# Patient Record
Sex: FEMALE | Race: WHITE | NOT HISPANIC OR LATINO | Employment: STUDENT | RURAL
[De-identification: names, ages, dates, MRNs, and addresses within clinical notes are randomized per-mention and may not be internally consistent; named-entity substitution may affect disease eponyms.]

---

## 2023-06-14 ENCOUNTER — OFFICE VISIT (OUTPATIENT)
Dept: FAMILY MEDICINE CLINIC | Facility: CLINIC | Age: 53
End: 2023-06-14
Payer: COMMERCIAL

## 2023-06-14 VITALS
OXYGEN SATURATION: 99 % | DIASTOLIC BLOOD PRESSURE: 106 MMHG | HEART RATE: 115 BPM | WEIGHT: 222 LBS | BODY MASS INDEX: 43.59 KG/M2 | HEIGHT: 60 IN | SYSTOLIC BLOOD PRESSURE: 178 MMHG

## 2023-06-14 DIAGNOSIS — R23.2 HOT FLASHES: Primary | ICD-10-CM

## 2023-06-14 DIAGNOSIS — Z13.220 SCREENING FOR HYPERLIPIDEMIA: ICD-10-CM

## 2023-06-14 DIAGNOSIS — Z13.1 SCREENING FOR DIABETES MELLITUS (DM): ICD-10-CM

## 2023-06-14 DIAGNOSIS — F41.1 GENERALIZED ANXIETY DISORDER: ICD-10-CM

## 2023-06-14 DIAGNOSIS — I10 PRIMARY HYPERTENSION: ICD-10-CM

## 2023-06-14 PROCEDURE — 99204 OFFICE O/P NEW MOD 45 MIN: CPT | Performed by: INTERNAL MEDICINE

## 2023-06-14 RX ORDER — PAROXETINE 10 MG/1
10 TABLET, FILM COATED ORAL NIGHTLY
Qty: 90 TABLET | Refills: 0 | Status: SHIPPED | OUTPATIENT
Start: 2023-06-14

## 2023-06-14 RX ORDER — HYDROXYZINE HYDROCHLORIDE 25 MG/1
25 TABLET, FILM COATED ORAL 3 TIMES DAILY PRN
Qty: 90 TABLET | Refills: 3 | Status: SHIPPED | OUTPATIENT
Start: 2023-06-14

## 2023-06-14 RX ORDER — HYDROCHLOROTHIAZIDE 25 MG/1
25 TABLET ORAL DAILY
Qty: 90 TABLET | Refills: 0 | Status: SHIPPED | OUTPATIENT
Start: 2023-06-14

## 2023-06-14 RX ORDER — HYDROXYZINE HYDROCHLORIDE 25 MG/1
25 TABLET, FILM COATED ORAL 3 TIMES DAILY PRN
COMMUNITY
Start: 2023-06-09 | End: 2023-06-14 | Stop reason: SDUPTHER

## 2023-06-14 NOTE — PROGRESS NOTES
Office Note     Name: Chelsie Mccoy    : 1970     MRN: [id number]     Chief Complaint  Establish Care, Hypertension, Anxiety, Menopause (Pt complains of menopause symptoms. She states this has caused rapid weight gain which has led to her stress. ), and Obesity    Subjective     History of Present Illness:  Chelsie Mccoy is a 52 y.o. female who presents today for EST   Previously was a  but is currently in school full time getting her masters in Sociology.  Is also moving and takes care of her mother.  Had a bad breakup about 5 years ago    Started having perimenopausal symptoms about 2 years ago and stopped having periods 2 years ago    Exercises 4 times per week (with both cardio and weight training) ad has become more diligent about limiting sweets over the last few years and has been limiting calories to about 1500 calories for at last a year, but weight gain has become very persistent, especially in the central abdomen, but has gained about 80 lb in the last 3 years.  Eat as a high fiber diet, has tried vegetarian diet.    Because she previously was very fit these symptoms provoke her anxiety and makes her dread social functions and causes her to avoid social settings and avoid going to the doctor.       Skin has become excessively dry in recent years whereas it has been oily in the past.  Body hair has thinned.    Is due for routine testing.    Was seen in ER and was given hydroxyzine which helps her  sleep at night and has ahead a somewhat claming effect but overall still feel incredibly anxious and stressed.  Feels that her weight gain, fear of health related issues, fear of being judged for weight gain etc has contributed significantly to her anxiety    Review of Systems:   Review of Systems   Gastrointestinal:  Negative for constipation, diarrhea and vomiting.     Past Medical History: History reviewed. No pertinent past medical history.    Past Surgical History:   Past  "Surgical History:   Procedure Laterality Date     SECTION      x2       Family History:   Family History   Problem Relation Age of Onset    Diabetes Mother     Heart disease Mother     Breast cancer Mother 48    Colon cancer Neg Hx        Social History:   Social History     Socioeconomic History    Marital status:    Tobacco Use    Smoking status: Never     Passive exposure: Never    Smokeless tobacco: Never   Vaping Use    Vaping Use: Never used   Substance and Sexual Activity    Alcohol use: Not Currently    Drug use: Never    Sexual activity: Defer       Immunizations:   Immunization History   Administered Date(s) Administered    COVID-19 (PFIZER) BIVALENT 12+YRS 2022    COVID-19 (PFIZER) Purple Cap Monovalent 2021, 2021, 2021    Influenza Injectable Mdck Pf Quad 2022        Medications:     Current Outpatient Medications:     hydrOXYzine (ATARAX) 25 MG tablet, Take 1 tablet by mouth 3 (Three) Times a Day As Needed. for anxiety, Disp: , Rfl:     Allergies:   No Known Allergies    Objective     Vital Signs  BP (!) 178/106   Pulse 115   Ht 152.4 cm (60\")   Wt 101 kg (222 lb)   SpO2 99%   BMI 43.36 kg/m²   Estimated body mass index is 43.36 kg/m² as calculated from the following:    Height as of this encounter: 152.4 cm (60\").    Weight as of this encounter: 101 kg (222 lb).          Physical Exam  Vitals and nursing note reviewed.   Constitutional:       Appearance: Normal appearance.   Neck:      Thyroid: No thyromegaly.   Cardiovascular:      Rate and Rhythm: Normal rate and regular rhythm.      Heart sounds: No murmur heard.    No friction rub. No gallop.   Pulmonary:      Effort: Pulmonary effort is normal.      Breath sounds: Normal breath sounds. No wheezing, rhonchi or rales.   Neurological:      Mental Status: She is alert.        Procedures     Assessment and Plan     Records obtained from patient's recent emergency room visit from [facility] " "[facility] with date of service 6/9/2023.  Summary: 52-year-old female who presented to the ER with anxiety and elevated blood pressure in the 180s to 190s systolic.  Had also reported menopausal symptoms of hot flashes.  Evaded blood pressure had recently.  Is dented her from getting a dental procedure completed.  Of note she also had an appointment scheduled in our office that same afternoon but went to ER beforehan so the appt was cancelled.    The note from [facility] in terms of past medical history surgical history family and social history all states \"see chart\" but there is no other information.  Pressure in the ER was 185/114.  He was given hydroxyzine.  She had an EKG not show signs of ischemia or arrhythmia.  Discharged with an outpatient prescription for hydroxyzine as needed for anxiety.  He did have some relief with the dose that was given in the ER.    It does not appear that any labs were done.    1. Hot flashes  - TSH Rfx On Abnormal To Free T4  - CBC Auto Differential  - PARoxetine (Paxil) 10 MG tablet; Take 1 tablet by mouth Every Night.  Dispense: 90 tablet; Refill: 0    2. Generalized anxiety disorder  - TSH Rfx On Abnormal To Free T4  - CBC Auto Differential  - PARoxetine (Paxil) 10 MG tablet; Take 1 tablet by mouth Every Night.  Dispense: 90 tablet; Refill: 0  - hydrOXYzine (ATARAX) 25 MG tablet; Take 1 tablet by mouth 3 (Three) Times a Day As Needed for Anxiety. for anxiety  Dispense: 90 tablet; Refill: 3    3. Primary hypertension  - TSH Rfx On Abnormal To Free T4  - Lipid Panel  - hydroCHLOROthiazide (HYDRODIURIL) 25 MG tablet; Take 1 tablet by mouth Daily.  Dispense: 90 tablet; Refill: 0    4. Screening for hyperlipidemia  - Lipid Panel    5. Screening for diabetes mellitus (DM)  - Hemoglobin A1c  - Comprehensive Metabolic Panel           Follow Up  Return in about 2 months (around 8/14/2023).    Patient was given instructions and counseling regarding her condition or for health maintenance " advice. Please see specific information pulled into the AVS if appropriate.     MD NICK [facility]  [address] [facility] [address] [phone]

## 2023-06-15 DIAGNOSIS — E78.2 MIXED HYPERLIPIDEMIA: Primary | ICD-10-CM

## 2023-06-15 DIAGNOSIS — R74.8 ELEVATED LIVER ENZYMES: ICD-10-CM

## 2023-06-15 LAB
ALBUMIN SERPL-MCNC: 4.8 G/DL (ref 3.8–4.8)
ALBUMIN/GLOB SERPL: 2.1 {RATIO} (ref 1.2–2.2)
ALP SERPL-CCNC: 99 IU/L (ref 44–121)
ALT SERPL-CCNC: 52 IU/L (ref 0–32)
AST SERPL-CCNC: 43 IU/L (ref 0–40)
BASOPHILS # BLD AUTO: 0.1 X10E3/UL (ref 0–0.2)
BASOPHILS NFR BLD AUTO: 1 %
BILIRUB SERPL-MCNC: 0.3 MG/DL (ref 0–1.2)
BUN SERPL-MCNC: 11 MG/DL (ref 6–24)
BUN/CREAT SERPL: 16 (ref 9–23)
CALCIUM SERPL-MCNC: 9.9 MG/DL (ref 8.7–10.2)
CHLORIDE SERPL-SCNC: 105 MMOL/L (ref 96–106)
CHOLEST SERPL-MCNC: 243 MG/DL (ref 100–199)
CO2 SERPL-SCNC: 19 MMOL/L (ref 20–29)
CREAT SERPL-MCNC: 0.7 MG/DL (ref 0.57–1)
EGFRCR SERPLBLD CKD-EPI 2021: 105 ML/MIN/1.73
EOSINOPHIL # BLD AUTO: 0.1 X10E3/UL (ref 0–0.4)
EOSINOPHIL NFR BLD AUTO: 2 %
ERYTHROCYTE [DISTWIDTH] IN BLOOD BY AUTOMATED COUNT: 13.5 % (ref 11.7–15.4)
GLOBULIN SER CALC-MCNC: 2.3 G/DL (ref 1.5–4.5)
GLUCOSE SERPL-MCNC: 109 MG/DL (ref 70–99)
HBA1C MFR BLD: 5.3 % (ref 4.8–5.6)
HCT VFR BLD AUTO: 42.1 % (ref 34–46.6)
HDLC SERPL-MCNC: 44 MG/DL
HGB BLD-MCNC: 14.9 G/DL (ref 11.1–15.9)
IMM GRANULOCYTES # BLD AUTO: 0 X10E3/UL (ref 0–0.1)
IMM GRANULOCYTES NFR BLD AUTO: 0 %
LDLC SERPL CALC-MCNC: 162 MG/DL (ref 0–99)
LYMPHOCYTES # BLD AUTO: 1.8 X10E3/UL (ref 0.7–3.1)
LYMPHOCYTES NFR BLD AUTO: 23 %
MCH RBC QN AUTO: 31.1 PG (ref 26.6–33)
MCHC RBC AUTO-ENTMCNC: 35.4 G/DL (ref 31.5–35.7)
MCV RBC AUTO: 88 FL (ref 79–97)
MONOCYTES # BLD AUTO: 0.6 X10E3/UL (ref 0.1–0.9)
MONOCYTES NFR BLD AUTO: 7 %
NEUTROPHILS # BLD AUTO: 5.5 X10E3/UL (ref 1.4–7)
NEUTROPHILS NFR BLD AUTO: 67 %
PLATELET # BLD AUTO: 232 X10E3/UL (ref 150–450)
POTASSIUM SERPL-SCNC: 4.2 MMOL/L (ref 3.5–5.2)
PROT SERPL-MCNC: 7.1 G/DL (ref 6–8.5)
RBC # BLD AUTO: 4.79 X10E6/UL (ref 3.77–5.28)
SODIUM SERPL-SCNC: 141 MMOL/L (ref 134–144)
TRIGL SERPL-MCNC: 198 MG/DL (ref 0–149)
TSH SERPL DL<=0.005 MIU/L-ACNC: 2.45 UIU/ML (ref 0.45–4.5)
VLDLC SERPL CALC-MCNC: 37 MG/DL (ref 5–40)
WBC # BLD AUTO: 8.1 X10E3/UL (ref 3.4–10.8)

## 2023-06-15 RX ORDER — ATORVASTATIN CALCIUM 20 MG/1
20 TABLET, FILM COATED ORAL DAILY
Qty: 90 TABLET | Refills: 1 | Status: SHIPPED | OUTPATIENT
Start: 2023-06-15

## 2023-07-21 RX ORDER — LISINOPRIL 10 MG/1
10 TABLET ORAL DAILY
Qty: 30 TABLET | Refills: 0 | Status: CANCELLED | OUTPATIENT
Start: 2023-07-21

## 2023-07-21 NOTE — TELEPHONE ENCOUNTER
Caller: Vashti Mccoye    Relationship: Self    Best call back number: [phone]     Requested Prescriptions:   Requested Prescriptions     Pending Prescriptions Disp Refills    lisinopril (PRINIVIL,ZESTRIL) 10 MG tablet 30 tablet 0     Sig: Take 1 tablet by mouth Daily.        Pharmacy where request should be sent: [facility] #[id number] [address] AT [facility] & [address] [facility] [phone] [facility] [phone] FX     Last office visit with prescribing clinician: 7/21/2023   Last telemedicine visit with prescribing clinician: Visit date not found   Next office visit with prescribing clinician: 8/16/2023     Additional details provided by patient: WAS SEEN TODAY AND NEEDS THIS CALLED IN     Does the patient have less than a 3 day supply:  [x] Yes  [] No    Would you like a call back once the refill request has been completed: [] Yes [x] No    If the office needs to give you a call back, can they leave a voicemail: [] Yes [x] No    Janeth Landry Rep   07/21/23 10:28 EDT

## 2023-09-10 DIAGNOSIS — R23.2 HOT FLASHES: ICD-10-CM

## 2023-09-10 DIAGNOSIS — I10 PRIMARY HYPERTENSION: ICD-10-CM

## 2023-09-10 DIAGNOSIS — E78.2 MIXED HYPERLIPIDEMIA: ICD-10-CM

## 2023-09-10 DIAGNOSIS — F41.1 GENERALIZED ANXIETY DISORDER: ICD-10-CM

## 2023-09-11 DIAGNOSIS — E78.2 MIXED HYPERLIPIDEMIA: ICD-10-CM

## 2023-09-11 DIAGNOSIS — I10 PRIMARY HYPERTENSION: ICD-10-CM

## 2023-09-11 DIAGNOSIS — R23.2 HOT FLASHES: ICD-10-CM

## 2023-09-11 DIAGNOSIS — F41.1 GENERALIZED ANXIETY DISORDER: ICD-10-CM

## 2023-09-11 RX ORDER — ATORVASTATIN CALCIUM 20 MG/1
20 TABLET, FILM COATED ORAL DAILY
Qty: 90 TABLET | Refills: 0 | Status: SHIPPED | OUTPATIENT
Start: 2023-09-11

## 2023-09-11 RX ORDER — HYDROCHLOROTHIAZIDE 25 MG/1
25 TABLET ORAL DAILY
Qty: 90 TABLET | Refills: 0 | Status: SHIPPED | OUTPATIENT
Start: 2023-09-11

## 2023-09-11 RX ORDER — PAROXETINE 10 MG/1
10 TABLET, FILM COATED ORAL NIGHTLY
Qty: 90 TABLET | Refills: 0 | Status: SHIPPED | OUTPATIENT
Start: 2023-09-11

## 2023-09-12 RX ORDER — HYDROCHLOROTHIAZIDE 25 MG/1
25 TABLET ORAL DAILY
Qty: 90 TABLET | Refills: 0 | OUTPATIENT
Start: 2023-09-12

## 2023-09-13 RX ORDER — ATORVASTATIN CALCIUM 20 MG/1
20 TABLET, FILM COATED ORAL DAILY
Qty: 90 TABLET | Refills: 1 | OUTPATIENT
Start: 2023-09-13

## 2023-09-13 RX ORDER — PAROXETINE 10 MG/1
10 TABLET, FILM COATED ORAL NIGHTLY
Qty: 90 TABLET | Refills: 0 | OUTPATIENT
Start: 2023-09-13

## 2023-10-15 DIAGNOSIS — I10 HYPERTENSION, ESSENTIAL: ICD-10-CM

## 2023-10-16 RX ORDER — LISINOPRIL 10 MG/1
10 TABLET ORAL DAILY
Qty: 90 TABLET | Refills: 0 | Status: SHIPPED | OUTPATIENT
Start: 2023-10-16

## 2023-12-01 DIAGNOSIS — F41.1 GENERALIZED ANXIETY DISORDER: ICD-10-CM

## 2023-12-01 DIAGNOSIS — E78.2 MIXED HYPERLIPIDEMIA: ICD-10-CM

## 2023-12-01 DIAGNOSIS — R23.2 HOT FLASHES: ICD-10-CM

## 2023-12-01 DIAGNOSIS — I10 PRIMARY HYPERTENSION: ICD-10-CM

## 2023-12-01 RX ORDER — ATORVASTATIN CALCIUM 20 MG/1
20 TABLET, FILM COATED ORAL DAILY
Qty: 90 TABLET | Refills: 0 | Status: SHIPPED | OUTPATIENT
Start: 2023-12-01

## 2023-12-01 RX ORDER — HYDROCHLOROTHIAZIDE 25 MG/1
25 TABLET ORAL DAILY
Qty: 90 TABLET | Refills: 0 | Status: SHIPPED | OUTPATIENT
Start: 2023-12-01

## 2023-12-01 RX ORDER — PAROXETINE 10 MG/1
10 TABLET, FILM COATED ORAL NIGHTLY
Qty: 90 TABLET | Refills: 0 | Status: SHIPPED | OUTPATIENT
Start: 2023-12-01

## 2023-12-07 DIAGNOSIS — I10 PRIMARY HYPERTENSION: ICD-10-CM

## 2023-12-07 RX ORDER — HYDROCHLOROTHIAZIDE 25 MG/1
25 TABLET ORAL DAILY
Qty: 90 TABLET | Refills: 0 | OUTPATIENT
Start: 2023-12-07

## 2024-01-15 DIAGNOSIS — I10 HYPERTENSION, ESSENTIAL: ICD-10-CM

## 2024-01-17 NOTE — TELEPHONE ENCOUNTER
Caller: Chelsie Mccoy    Relationship: Self    Best call back number: [phone]    Requested Prescriptions:   Requested Prescriptions     Pending Prescriptions Disp Refills    lisinopril (PRINIVIL,ZESTRIL) 10 MG tablet [Pharmacy Med Name: LISINOPRIL 10MG TABLETS] 90 tablet 0     Sig: TAKE 1 TABLET BY MOUTH DAILY        Pharmacy where request should be sent: [facility] #[id number] [place] [address] [address] AT [facility] & [address] [facility] [phone] [facility] [phone]      Last office visit with prescribing clinician: 7/21/2023   Last telemedicine visit with prescribing clinician: Visit date not found   Next office visit with prescribing clinician: 1/22/2024     Additional details provided by patient:     Does the patient have less than a 3 day supply:  [x] Yes  [] No    Janeth Padilla Rep   01/17/24 09:10 EST

## 2024-01-18 NOTE — TELEPHONE ENCOUNTER
PT CALLED TO CHECK STATUS FOR RX      lisinopril (PRINIVIL,ZESTRIL) 10 MG tablet   REFILL.    PLEASE ADVISE.cALL BACK:[id number]    [facility] #[id number] [place] [address] [address] AT [facility] & [address] [facility] - [phone] [facility] [phone] FX

## 2024-01-19 RX ORDER — LISINOPRIL 10 MG/1
10 TABLET ORAL DAILY
Qty: 90 TABLET | Refills: 0 | Status: SHIPPED | OUTPATIENT
Start: 2024-01-19

## 2024-01-19 NOTE — TELEPHONE ENCOUNTER
PT CALLED TO CHECK STATUS FOR RX  lisinopril (PRINIVIL,ZESTRIL) 10 MG tablet .    PLEASE ADVISE.cALL BACK:[id number]

## 2024-03-15 ENCOUNTER — OFFICE VISIT (OUTPATIENT)
Dept: FAMILY MEDICINE CLINIC | Facility: CLINIC | Age: 54
End: 2024-03-15
Payer: COMMERCIAL

## 2024-03-15 VITALS
HEIGHT: 60 IN | OXYGEN SATURATION: 100 % | BODY MASS INDEX: 46.72 KG/M2 | DIASTOLIC BLOOD PRESSURE: 70 MMHG | SYSTOLIC BLOOD PRESSURE: 140 MMHG | WEIGHT: 238 LBS | HEART RATE: 85 BPM

## 2024-03-15 DIAGNOSIS — I10 HYPERTENSION, ESSENTIAL: ICD-10-CM

## 2024-03-15 DIAGNOSIS — I10 PRIMARY HYPERTENSION: ICD-10-CM

## 2024-03-15 DIAGNOSIS — Z11.59 ENCOUNTER FOR HEPATITIS C SCREENING TEST FOR LOW RISK PATIENT: ICD-10-CM

## 2024-03-15 DIAGNOSIS — F41.1 GENERALIZED ANXIETY DISORDER: ICD-10-CM

## 2024-03-15 DIAGNOSIS — E78.2 MIXED HYPERLIPIDEMIA: ICD-10-CM

## 2024-03-15 DIAGNOSIS — I10 POORLY-CONTROLLED HYPERTENSION: ICD-10-CM

## 2024-03-15 DIAGNOSIS — R23.2 HOT FLASHES: ICD-10-CM

## 2024-03-15 DIAGNOSIS — E66.01 MORBID (SEVERE) OBESITY DUE TO EXCESS CALORIES: ICD-10-CM

## 2024-03-15 DIAGNOSIS — Z12.31 ENCOUNTER FOR SCREENING MAMMOGRAM FOR MALIGNANT NEOPLASM OF BREAST: ICD-10-CM

## 2024-03-15 DIAGNOSIS — Z13.1 SCREENING FOR DIABETES MELLITUS (DM): ICD-10-CM

## 2024-03-15 DIAGNOSIS — Z12.4 SCREENING FOR CERVICAL CANCER: ICD-10-CM

## 2024-03-15 DIAGNOSIS — Z12.11 SCREEN FOR COLON CANCER: ICD-10-CM

## 2024-03-15 DIAGNOSIS — Z00.00 ADULT GENERAL MEDICAL EXAM: Primary | ICD-10-CM

## 2024-03-15 RX ORDER — PAROXETINE 10 MG/1
10 TABLET, FILM COATED ORAL NIGHTLY
Qty: 90 TABLET | Refills: 1 | Status: SHIPPED | OUTPATIENT
Start: 2024-03-15

## 2024-03-15 RX ORDER — ATORVASTATIN CALCIUM 20 MG/1
20 TABLET, FILM COATED ORAL DAILY
Qty: 90 TABLET | Refills: 1 | Status: SHIPPED | OUTPATIENT
Start: 2024-03-15

## 2024-03-15 RX ORDER — LISINOPRIL 10 MG/1
10 TABLET ORAL DAILY
Qty: 90 TABLET | Refills: 1 | Status: SHIPPED | OUTPATIENT
Start: 2024-03-15

## 2024-03-15 RX ORDER — HYDROCHLOROTHIAZIDE 25 MG/1
25 TABLET ORAL DAILY
Qty: 90 TABLET | Refills: 1 | Status: SHIPPED | OUTPATIENT
Start: 2024-03-15

## 2024-03-15 NOTE — PROGRESS NOTES
Office Note     Name: Chelsie Mccoy    : 1970     MRN: [id number]     Chief Complaint  Annual Exam    Subjective     History of Present Illness:  Chelsie Mccoy is a 53 y.o. female who presents today for annual and poorly controlled HTN    She was last seen in 2023 and  was supposed to come back in 2023, but has cancelled 17 appointmetns since then and has not been seen.     HTN:  BP tends to be running 110s/60s at home but ran out of [facility] several days ago, it is well controleld when she takes both     Heart rate has been doing much better than previsuoly.    Anxiety doing well on paxil, feels good overall.    Obesity: would like to lost weight.  Does a lot of active  activity with work and her dogs     Diet/Physical activity:normal        PHQ-2 Depression Screening  Little interest or pleasure in doing things? 0-->not at all   Feeling down, depressed, or hopeless? 0-->not at all   PHQ-2 Total Score 0          Review of Systems:   Review of Systems    Past Medical History:   Past Medical History:   Diagnosis Date    Hypertension        Past Surgical History:   Past Surgical History:   Procedure Laterality Date     SECTION      x2       Family History:   Family History   Problem Relation Age of Onset    Diabetes Mother     Heart disease Mother     Breast cancer Mother 48    Colon cancer Neg Hx        Social History:   Social History     Socioeconomic History    Marital status:    Tobacco Use    Smoking status: Never     Passive exposure: Never    Smokeless tobacco: Never   Vaping Use    Vaping status: Never Used   Substance and Sexual Activity    Alcohol use: Not Currently    Drug use: Never    Sexual activity: Not Currently     Birth control/protection: Post-menopausal       Immunizations:   Immunization History   Administered Date(s) Administered    COVID-19 (PFIZER) BIVALENT 12+YRS 2022    COVID-19 (PFIZER) Purple Cap Monovalent 2021, 2021,  "11/05/2021    Influenza Injectable Mdck Pf Quad 09/11/2022        Medications:     Current Outpatient Medications:     atorvastatin (LIPITOR) 20 MG tablet, Take 1 tablet by mouth Daily., Disp: 90 tablet, Rfl: 1    hydroCHLOROthiazide 25 MG tablet, Take 1 tablet by mouth Daily., Disp: 90 tablet, Rfl: 1    hydrOXYzine (ATARAX) 25 MG tablet, Take 1 tablet by mouth 3 (Three) Times a Day As Needed for Anxiety. for anxiety, Disp: 90 tablet, Rfl: 3    lisinopril (PRINIVIL,ZESTRIL) 10 MG tablet, Take 1 tablet by mouth Daily., Disp: 90 tablet, Rfl: 1    PARoxetine (Paxil) 10 MG tablet, Take 1 tablet by mouth Every Night., Disp: 90 tablet, Rfl: 1    Allergies:   No Known Allergies    Objective     Vital Signs  /70   Pulse 85   Ht 152.4 cm (60\")   Wt 108 kg (238 lb)   SpO2 100%   BMI 46.48 kg/m²   Estimated body mass index is 46.48 kg/m² as calculated from the following:    Height as of this encounter: 152.4 cm (60\").    Weight as of this encounter: 108 kg (238 lb).            Physical Exam  Vitals and nursing note reviewed.   Constitutional:       Appearance: Normal appearance.   Cardiovascular:      Rate and Rhythm: Normal rate and regular rhythm.      Heart sounds: No murmur heard.     No friction rub. No gallop.   Pulmonary:      Effort: Pulmonary effort is normal.      Breath sounds: Normal breath sounds. No wheezing, rhonchi or rales.   Neurological:      Mental Status: She is alert.              Procedures       Colorectal Screening:     Last Completed Colonoscopy       This patient has no relevant Health Maintenance data.          Pap:    Last Completed Pap Smear       This patient has no relevant Health Maintenance data.           Mammogram:    Last Completed Mammogram       This patient has no relevant Health Maintenance data.                         Assessment and Plan     Annual Preventative Exam    Healthcare Maintenance:   Chelsie Mccoy voices understanding and acceptance of this advice and will call " back with any further questions or concerns. AVS with preventive healthcare tips printed for patient.     Anticipatory Guidance:  Ages 40 to 64 Counseling/Anticipatory Guidance Discussed: immunizations and screenings    1. Adult general medical exam      2. Encounter for screening mammogram for malignant neoplasm of breast    - Mammo Screening Bilateral With CAD    3. Screen for colon cancer    - Ambulatory Referral For Screening Colonoscopy    4. Encounter for hepatitis C screening test for low risk patient    - Hepatitis C Antibody  - Lipid Panel    5. Screening for diabetes mellitus (DM)    - Comprehensive Metabolic Panel  - Hemoglobin A1c    6. Poorly-controlled hypertension    - TSH Rfx On Abnormal To Free T4  - Comprehensive Metabolic Panel    7. Mixed hyperlipidemia    - TSH Rfx On Abnormal To Free T4  - Comprehensive Metabolic Panel  - atorvastatin (LIPITOR) 20 MG tablet; Take 1 tablet by mouth Daily.  Dispense: 90 tablet; Refill: 1    8. Morbid (severe) obesity due to excess calories    Info added to AVS    9. Primary hypertension    - hydroCHLOROthiazide 25 MG tablet; Take 1 tablet by mouth Daily.  Dispense: 90 tablet; Refill: 1    10. Hypertension, essential    - lisinopril (PRINIVIL,ZESTRIL) 10 MG tablet; Take 1 tablet by mouth Daily.  Dispense: 90 tablet; Refill: 1    11. Hot flashes    - PARoxetine (Paxil) 10 MG tablet; Take 1 tablet by mouth Every Night.  Dispense: 90 tablet; Refill: 1    12. Generalized anxiety disorder    - PARoxetine (Paxil) 10 MG tablet; Take 1 tablet by mouth Every Night.  Dispense: 90 tablet; Refill: 1    13. Screening for cervical cancer    - Ambulatory Referral to Obstetrics / Gynecology        Follow Up  No follow-ups on file.    Celina Beltran MD  [facility]  [address] [place] [id number] [phone]

## 2024-03-16 LAB
ALBUMIN SERPL-MCNC: 4.3 G/DL (ref 3.8–4.9)
ALBUMIN/GLOB SERPL: 1.9 {RATIO} (ref 1.2–2.2)
ALP SERPL-CCNC: 141 IU/L (ref 44–121)
ALT SERPL-CCNC: 39 IU/L (ref 0–32)
AST SERPL-CCNC: 35 IU/L (ref 0–40)
BILIRUB SERPL-MCNC: 0.5 MG/DL (ref 0–1.2)
BUN SERPL-MCNC: 11 MG/DL (ref 6–24)
BUN/CREAT SERPL: 17 (ref 9–23)
CALCIUM SERPL-MCNC: 9.1 MG/DL (ref 8.7–10.2)
CHLORIDE SERPL-SCNC: 107 MMOL/L (ref 96–106)
CHOLEST SERPL-MCNC: 159 MG/DL (ref 100–199)
CO2 SERPL-SCNC: 21 MMOL/L (ref 20–29)
CREAT SERPL-MCNC: 0.65 MG/DL (ref 0.57–1)
EGFRCR SERPLBLD CKD-EPI 2021: 107 ML/MIN/1.73
GLOBULIN SER CALC-MCNC: 2.3 G/DL (ref 1.5–4.5)
GLUCOSE SERPL-MCNC: 93 MG/DL (ref 70–99)
HBA1C MFR BLD: 5.7 % (ref 4.8–5.6)
HCV IGG SERPL QL IA: NON REACTIVE
HDLC SERPL-MCNC: 46 MG/DL
LDLC SERPL CALC-MCNC: 93 MG/DL (ref 0–99)
POTASSIUM SERPL-SCNC: 4.1 MMOL/L (ref 3.5–5.2)
PROT SERPL-MCNC: 6.6 G/DL (ref 6–8.5)
SODIUM SERPL-SCNC: 144 MMOL/L (ref 134–144)
TRIGL SERPL-MCNC: 108 MG/DL (ref 0–149)
TSH SERPL DL<=0.005 MIU/L-ACNC: 2.95 UIU/ML (ref 0.45–4.5)
VLDLC SERPL CALC-MCNC: 20 MG/DL (ref 5–40)

## 2024-04-16 DIAGNOSIS — I10 HYPERTENSION, ESSENTIAL: ICD-10-CM

## 2024-04-16 RX ORDER — LISINOPRIL 10 MG/1
10 TABLET ORAL DAILY
Qty: 90 TABLET | Refills: 1 | OUTPATIENT
Start: 2024-04-16

## 2024-09-01 DIAGNOSIS — I10 PRIMARY HYPERTENSION: ICD-10-CM

## 2024-09-03 RX ORDER — HYDROCHLOROTHIAZIDE 25 MG/1
25 TABLET ORAL DAILY
Qty: 90 TABLET | Refills: 1 | Status: SHIPPED | OUTPATIENT
Start: 2024-09-03

## 2024-09-05 DIAGNOSIS — R23.2 HOT FLASHES: ICD-10-CM

## 2024-09-05 DIAGNOSIS — F41.1 GENERALIZED ANXIETY DISORDER: ICD-10-CM

## 2024-09-05 DIAGNOSIS — I10 HYPERTENSION, ESSENTIAL: ICD-10-CM

## 2024-09-09 RX ORDER — PAROXETINE 10 MG/1
10 TABLET, FILM COATED ORAL NIGHTLY
Qty: 90 TABLET | Refills: 1 | Status: SHIPPED | OUTPATIENT
Start: 2024-09-09

## 2024-09-09 RX ORDER — LISINOPRIL 10 MG/1
10 TABLET ORAL DAILY
Qty: 90 TABLET | Refills: 1 | Status: SHIPPED | OUTPATIENT
Start: 2024-09-09

## 2024-09-26 ENCOUNTER — E-VISIT (OUTPATIENT)
Dept: FAMILY MEDICINE CLINIC | Facility: TELEHEALTH | Age: 54
End: 2024-09-26
Payer: COMMERCIAL

## 2024-09-26 PROCEDURE — FABRICHEALTHVISIT: Performed by: NURSE PRACTITIONER

## 2024-10-23 ENCOUNTER — TELEMEDICINE (OUTPATIENT)
Dept: FAMILY MEDICINE CLINIC | Facility: TELEHEALTH | Age: 54
End: 2024-10-23
Payer: COMMERCIAL

## 2024-10-23 DIAGNOSIS — H92.01 EARACHE ON RIGHT: Primary | ICD-10-CM

## 2024-10-23 RX ORDER — BENZONATATE 200 MG/1
200 CAPSULE ORAL 3 TIMES DAILY PRN
COMMUNITY
Start: 2024-09-26 | End: 2024-10-23

## 2024-10-23 RX ORDER — AMOXICILLIN 875 MG
875 TABLET ORAL 2 TIMES DAILY
Qty: 20 TABLET | Refills: 0 | Status: SHIPPED | OUTPATIENT
Start: 2024-10-23 | End: 2024-11-02

## 2024-10-23 RX ORDER — ONDANSETRON 4 MG/1
4 TABLET, ORALLY DISINTEGRATING ORAL EVERY 8 HOURS PRN
Qty: 10 TABLET | Refills: 0 | Status: SHIPPED | OUTPATIENT
Start: 2024-10-23

## 2024-10-23 NOTE — PATIENT INSTRUCTIONS
If symptoms do not improve in 5 days or worsen, follow up at urgent care or with your primary care provider.        Earache, Adult  An earache, or ear pain, can be caused by many things, including:  An infection.  Ear wax buildup.  Ear pressure.  Something in the ear that should not be there (foreign body).  A sore throat.  Tooth problems.  Jaw problems.  Treatment of the earache will depend on the cause. If the cause is not clear or cannot be known, you may need to watch your symptoms until your earache goes away or until a cause is found.  Follow these instructions at home:  Medicines  Take or apply over-the-counter and prescription medicines only as told by your health care provider.  If you were prescribed antibiotics, use them as told by your health care provider. Do not stop using the antibiotic even if you start to feel better.  Do not put anything in your ear other than medicine that is prescribed by your health care provider.  Managing pain     If directed, apply heat to the affected area as often as told by your health care provider. Use the heat source that your health care provider recommends, such as a moist heat pack or a heating pad.  Place a towel between your skin and the heat source.  Leave the heat on for 20-30 minutes.  If your skin turns bright red, remove the heat right away to prevent burns. The risk of burns is higher if you cannot feel pain, heat, or cold.  If directed, put ice on the affected area. To do this:  Put ice in a plastic bag.  Place a towel between your skin and the bag.  Leave the ice on for 20 minutes, 2-3 times a day.  If your skin turns bright red, remove the ice right away to prevent skin damage. The risk of skin damage is higher if you cannot feel pain, heat, or cold.     General instructions  Pay attention to any changes in your symptoms.  Try resting in an upright position instead of lying down. This may help to reduce pressure in your ear and relieve pain.  Chew gum if  it helps to relieve your ear pain.  Treat any allergies as told by your health care provider.  Drink enough fluid to keep your urine pale yellow.  It is up to you to get the results of any tests that were done. Ask your health care provider, or the department that is doing the tests, when your results will be ready.  Contact a health care provider if:  Your pain does not improve within 2 days.  Your earache gets worse.  You have new symptoms.  You have a fever.  Get help right away if:  You have a severe headache.  You have a stiff neck.  You have trouble swallowing.  You have redness or swelling behind your ear.  You have fluid or blood coming from your ear.  You have hearing loss.  You feel dizzy.  This information is not intended to replace advice given to you by your health care provider. Make sure you discuss any questions you have with your health care provider.  Document Revised: 05/01/2023 Document Reviewed: 05/01/2023  Elsevier Patient Education © 2024 [employer] Inc[employer]

## 2024-10-23 NOTE — LETTER
October 23, 2024     Patient: Chelsie Mccoy   YOB: 1970   Date of Visit: 10/23/2024       To Whom it May Concern:    Chelsie Mccoy was seen in my clinic on 10/23/2024. She may return to school on Friday 10/25/2024 .    Sincerely,          URGENT CARE VIDEO VISIT PROVIDER        CC: No Recipients

## 2024-10-23 NOTE — PROGRESS NOTES
CHIEF COMPLAINT  Chief Complaint   Patient presents with    Earache    Fever         HPI  Chelsie Mccoy is a 54 y.o. female  presents with complaint of right earache and fever. She has had ear infections before and this feels the same.     Review of Systems   Constitutional:  Positive for fever. Negative for chills, diaphoresis and fatigue.   HENT:  Positive for ear pain. Negative for congestion, ear discharge, hearing loss and rhinorrhea.    Respiratory:  Negative for cough.    Neurological:  Negative for headaches.       Past Medical History:   Diagnosis Date    Hypertension        Family History   Problem Relation Age of Onset    Diabetes Mother     Heart disease Mother     Breast cancer Mother 48    Colon cancer Neg Hx        Social History     Socioeconomic History    Marital status:    Tobacco Use    Smoking status: Never     Passive exposure: Never    Smokeless tobacco: Never   Vaping Use    Vaping status: Never Used   Substance and Sexual Activity    Alcohol use: Not Currently    Drug use: Never    Sexual activity: Not Currently     Birth control/protection: Post-menopausal         There were no vitals taken for this visit.    PHYSICAL EXAM  Physical Exam   Constitutional: She is oriented to person, place, and time. She appears well-developed and well-nourished. She does not have a sickly appearance. She does not appear ill. No distress.   HENT:   Head: Normocephalic and atraumatic.   Eyes: EOM are normal.   Neck: Neck normal appearance.  Pulmonary/Chest: Effort normal.  No respiratory distress.  Neurological: She is alert and oriented to person, place, and time.   Skin: Skin is dry.   Psychiatric: She has a normal mood and affect.           Diagnoses and all orders for this visit:    1. Earache on right (Primary)    Other orders  -     amoxicillin (AMOXIL) 875 MG tablet; Take 1 tablet by mouth 2 (Two) Times a Day for 10 days.  Dispense: 20 tablet; Refill: 0  -     ondansetron ODT (ZOFRAN-ODT) 4 MG  disintegrating tablet; Place 1 tablet on the tongue Every 8 (Eight) Hours As Needed for Nausea or Vomiting.  Dispense: 10 tablet; Refill: 0        Mode of visit: Video   Myself and Chelsie Mccoy participated in this visit. The patient is located in [address] [place] [address]. I am located in [place], [place]. Mychart and Twilio were utilized.   You have chosen to receive care through a telehealth visit.    You have chosen to receive care through a telehealth visit.   Does the patient consent to use a video/audio connection for your medical care today? Yes       Note Disclaimer: At [facility], we believe that sharing information builds trust and better   relationships. You are receiving this note because you recently visited [facility]. It is possible you   will see health information before a provider has talked with you about it. This kind of information can   be easy to misunderstand. To help you fully understand what it means for your health, we urge you to   discuss this note with your provider.    Maris Portillo, NALINI  10/23/2024  11:06 EDT

## 2024-10-23 NOTE — LETTER
October 23, 2024     Patient: Chelsie Mccoy   YOB: 1970   Date of Visit: 10/23/2024       To Whom it May Concern:    Chelsie Mccoy was seen in my clinic on 10/23/2024. She may return to school on Friday 10/25/2024 .      Sincerely,      NALINI Briscoe     URGENT CARE VIDEO VISIT PROVIDER        CC: No Recipients

## 2024-12-22 DIAGNOSIS — I10 HYPERTENSION, ESSENTIAL: ICD-10-CM

## 2024-12-23 RX ORDER — LISINOPRIL 10 MG/1
10 TABLET ORAL DAILY
Qty: 90 TABLET | Refills: 1 | Status: SHIPPED | OUTPATIENT
Start: 2024-12-23

## 2025-01-15 ENCOUNTER — E-VISIT (OUTPATIENT)
Dept: FAMILY MEDICINE CLINIC | Facility: TELEHEALTH | Age: 55
End: 2025-01-15
Payer: COMMERCIAL

## 2025-01-15 PROCEDURE — FABRICHEALTHVISIT: Performed by: NURSE PRACTITIONER

## 2025-01-16 RX ORDER — FLUTICASONE PROPIONATE 50 MCG
2 SPRAY, SUSPENSION (ML) NASAL DAILY
Start: 2025-01-16 | End: 2025-02-15

## 2025-01-16 RX ORDER — COVID-19 ANTIGEN TEST
1 KIT MISCELLANEOUS ONCE
Qty: 1 KIT | Refills: 0 | Status: SHIPPED | OUTPATIENT
Start: 2025-01-16 | End: 2025-01-16

## 2025-01-16 RX ORDER — BENZONATATE 200 MG/1
200 CAPSULE ORAL 3 TIMES DAILY PRN
Start: 2025-01-16 | End: 2025-01-21

## 2025-01-16 NOTE — E-VISIT TREATED
Date: 01/15/2025 22:03:27  Clinician: Jailene Marin  Clinician NPI: [id number]  Patient: Chelsie Mccoy  Patient : 1970  Patient Address: [address] [place] [address]  Patient Phone: [phone]  Visit Protocol: URI  Patient Summary:  Chelsie is a 54 year old ( : 1970 ) female who initiated a visit for cold, sinus infection, or influenza.     Chelsie states the symptoms started 1-2 days ago.   Symptom start date: 2024   The symptoms consist of a   headache, ear pain, rhinitis, myalgia, nasal congestion, nausea, chills, diarrhea, tooth pain, a sore throat, a cough, facial pain or pressure, and malaise. Chelsie is experiencing difficulty breathing due to nasal congestion but is not short of breath.   Chelsie also feels feverish but was unable to measure temperature.   Symptom details     Nasal secretions: The color of the mucus is yellow, clear, and blood-tinged.    Cough: Chelsie coughs almost every minute and the cough is more bothersome at night. Phlegm comes into the throat when coughing. Chelsie believes the cough is caused by post-nasal drip. The color of the phlegm is white.     Sore throat: Chelsie reports having moderate throat pain (4-6 on a 10 point pain scale), has exudate on the tonsils, and can swallow liquids without any difficulty. The lymph nodes in the neck are not enlarged. A rash has not appeared on the skin since   the sore throat started.     Facial pain or pressure: The facial pain or pressure feels worse when bending over or leaning forward.     Headache: The headache is moderate (4-6 on a 10 point pain scale).     Tooth pain: The tooth pain is not caused by a cavity, recent dental work, or other mouth problems.      Chelsie denies having vomiting, anosmia and ageusia, enlarged lymph nodes, and wheezing. Chelsie also denies pain in the front of the neck that sometimes moves to the ear, experiencing difficulty opening their mouth due to pain or swelling in the  jaw   muscles, having recent facial or sinus surgery in the past 60 days, and taking antibiotic medication in the past month.   Precipitating events  Chelsie is not sure if they have been exposed to someone with strep throat. Chelsie has not recently been   exposed to someone with influenza. Chelsie has been in close contact with the following high risk individuals: people with asthma, heart disease or diabetes and adults 65 or older.    Chelsie has received the influenza vaccine more than 2 weeks ago.     Pertinent COVID-19 (Coronavirus) information  Since the symptoms started, Chelsie has not tested for COVID-19. Chelsie was not able to re-test today.   Chelsie needs a COVID-19 test for school/college.   Chelsie has received a COVID-19 vaccine in the past   year.     Pertinent medical history    Chelsie reports having the following conditions:   Hypertension    Chelsie had 2 sinus infections within the past year.   A provider has not told Chelsie to avoid NSAIDs.   Chelsie does not get yeast infections when an   antibiotic is taken.   Chelsie does not have diabetes. Chelsie denies having immunosuppressive conditions (e.g., chemotherapy, HIV, organ transplant, long-term use of steroids or other immunosuppressive medications, splenectomy). Chelsie denies having   congestive heart failure, heart disease, and severe COPD. Chelsie does not have asthma.   Chelsie does not smoke or use smokeless tobacco. Chelsie does not vape or use other e-cigarette products.   Chelsie denies pregnancy and denies breastfeeding.   Weight:   222 lbs (100.7 kg)    MEDICATIONS: lisinopril oral, ondansetron oral, paroxetine oral, hydrochlorothiazide oral, hydroxyzine HCl oral, ALLERGIES: NKDA  Clinician Response:  Dear Chelsie,  Based on the information provided, you have a viral upper respiratory infection, otherwise known as a cold. Symptoms vary from person to person, but can include sneezing, coughing, a runny nose, sore throat, and   headache and range  from mild to severe.  Unfortunately, there are no medications that can cure a cold, so treatment is focused on controlling symptoms as much as possible. Most people gradually feel better until symptoms are gone in 1-2 weeks.    Medication information  Because you have a viral infection, antibiotics will not help you get better. Treating a viral infection with antibiotics could actually make you feel worse.  I am prescribing:       Fluticasone 50 mcg/actuation nasal spray. Inhale 2 sprays in each nostril 1 time per day; after 1 week, may adjust to 1 - 2 sprays in each nostril 1 time per day. This medication takes several days to start working, so keep taking it even if it doesn't   help right away. There are no refills with this prescription.      Benzonatate 200 mg oral capsule. Take 1 capsule 3 times a day as needed for cough. There are no refills with this prescription.     Unless you are allergic to the over-the-counter medication(s) below, I recommend using:       Acetaminophen (Tylenol or store brand) oral tablet. Take 1-2 tablets by mouth every 4-6 hours to help with the discomfort.      Ibuprofen (Advil or store brand) 200 mg oral tablet. Take 1-3 tablets (200-600 mg) by mouth every 8 hours to help with the discomfort. Make sure to take the ibuprofen with food. Do not exceed 2400 mg in 24 hours.     Over-the-counter medications do not require a prescription. Ask the pharmacist if you have any questions.  Tips for using a nasal spray:     When the medication is being sprayed in your nose, point the tip of the nasal spray towards your ear.    Do not blow your nose after using the spray.    Do not lean your head back after using the spray as it will go down your throat.    Wipe the tip of the nose piece after use with a dry, clean tissue.     Self care  Steps you can take to be as comfortable as possible:     Rest.    Drink plenty of fluids.    Take a warm shower to loosen congestion.    Use a cool-mist  humidifier.    Use throat lozenges.    Suck on frozen items such as popsicles.    Drink hot tea with lemon and honey.    Gargle with warm salt water (1/4 teaspoon of salt per 8 ounce glass of water).    Take a spoonful of honey to reduce your cough.     When to seek care  Please be seen in a clinic or urgent care if any of the following occur:   New symptoms develop, or symptoms become worse   Call 911 or go to the emergency room if any of the following occur:     Difficulty breathing    If you feel that your throat is closing off    Suddenly develop a rash    Unable to swallow fluids or are drooling     For the latest updates on COVID-19 (Coronavirus), please visit the Centers for Disease Control and Prevention (CDC). Also, your state and local health department websites may provide additional guidance regarding testing and isolation recommendations   for your location.   Diagnosis: Viral URI  Diagnosis ICD: J06.9    Follow up instructions:  ATTENTION: If you have been prescribed medications, your prescriptions will not be sent until you choose your pharmacy. To do so open the link within your notification, or go to [employer] You [employer] and click eVisit in the menu to open your   treatment plan. From there, you can select your pharmacy at the bottom of your after visit summary. You can also go to [email or web address]   Additional Clinician Notes:  rest   fluids  take an at home COVID test  PCP if symptoms continue   ER for any worsening symptoms such as high fever, chest pain, drooling or Shortness of breath   Prescriptions  Prescription: benzonatate 200 mg oral capsule, take 1 capsule 3 times a day as needed for cough  Sent To: [facility] #[id number] - [id number] [address] [place] [address]  Prescription: fluticasone 50 mcg/actuation nasal spray,suspension, inhale 2 sprays in each nostril 1 time per day; after 1 week, may adjust to 1 - 2 sprays in each nostril 1 time per  day.  Sent To: [facility] #[id number] - [id number] - [address] [place] [address]

## 2025-01-18 ENCOUNTER — TELEMEDICINE (OUTPATIENT)
Dept: FAMILY MEDICINE CLINIC | Facility: TELEHEALTH | Age: 55
End: 2025-01-18

## 2025-01-18 VITALS — TEMPERATURE: 102 F

## 2025-01-18 DIAGNOSIS — J06.9 ACUTE URI: Primary | ICD-10-CM

## 2025-01-18 DIAGNOSIS — J22 LOWER RESPIRATORY INFECTION: ICD-10-CM

## 2025-01-18 RX ORDER — GUAIFENESIN 600 MG/1
600 TABLET, EXTENDED RELEASE ORAL 2 TIMES DAILY
Qty: 28 TABLET | Refills: 0 | Status: SHIPPED | OUTPATIENT
Start: 2025-01-18 | End: 2025-02-01

## 2025-01-18 RX ORDER — AZITHROMYCIN 250 MG/1
TABLET, FILM COATED ORAL
Qty: 6 TABLET | Refills: 0 | Status: SHIPPED | OUTPATIENT
Start: 2025-01-18

## 2025-01-18 RX ORDER — DEXTROMETHORPHAN HYDROBROMIDE AND PROMETHAZINE HYDROCHLORIDE 15; 6.25 MG/5ML; MG/5ML
5 SYRUP ORAL NIGHTLY PRN
Qty: 118 ML | Refills: 0 | Status: SHIPPED | OUTPATIENT
Start: 2025-01-18

## 2025-01-18 NOTE — PROGRESS NOTES
Mode of Visit: Video  Location of patient: -HOME-  Location of provider: +HOME+  You have chosen to receive care through a telehealth visit.  The patient has signed the video visit consent form.  The visit included audio and video interaction. No technical issues occurred during this visit.    IDALIA Mccoy is a 54 y.o. female  presents with complaint of cough, sinus problem.  Patient reports cough, sore throat, runny nose and eyes, fever, headache,  chills, dizziness when coughing.  Her temperature is reported as 102 this morning.  She did take Tylenol and her temperature is now 98.9   She was seen via e-visit 01/15/2025.  At that visit she was diagnosed with upper respiratory infection prescribed Flonase and Tessalon Perles.  The patient reports that it is not helping.  She reports that she coughs at times so hard that she vomits.  She also reports that she coughs so hard that the room spins.  Additional symptoms she is having are noted in the ROS portion of this visit she has been sick now for 6 days.  She has taken a cold and flu medication for her symptoms.    Review of Systems   Constitutional:  Positive for appetite change (decreased), chills, fatigue and fever.   HENT:  Positive for congestion, postnasal drip, rhinorrhea, sinus pressure, sinus pain, sneezing and sore throat.    Eyes:  Positive for discharge (clear).   Respiratory:  Positive for cough and wheezing (when asleep). Negative for chest tightness and shortness of breath.         Persistent cough at times   Gastrointestinal:  Positive for vomiting (with cough). Negative for diarrhea and nausea.   Musculoskeletal:  Positive for myalgias.   Neurological:  Positive for dizziness (with cough) and headaches.       Past Medical History:   Diagnosis Date    Hypertension        Family History   Problem Relation Age of Onset    Diabetes Mother     Heart disease Mother     Breast cancer Mother 48    Colon cancer Neg Hx        Social History      Socioeconomic History    Marital status:    Tobacco Use    Smoking status: Never     Passive exposure: Never    Smokeless tobacco: Never   Vaping Use    Vaping status: Never Used   Substance and Sexual Activity    Alcohol use: Not Currently    Drug use: Never    Sexual activity: Not Currently     Birth control/protection: Post-menopausal       Chelsie Mccoy  reports that she has never smoked. She has never been exposed to tobacco smoke. She has never used smokeless tobacco.       Temp (!) 102 °F (38.9 °C)   Breastfeeding No     PHYSICAL EXAM  Physical Exam   Constitutional: She is oriented to person, place, and time. She appears well-developed.   HENT:   Head: Normocephalic and atraumatic.   Nose: Congestion present. Right sinus exhibits maxillary sinus tenderness (Patient directed exam) and frontal sinus tenderness (Patient directed exam). Left sinus exhibits maxillary sinus tenderness (Patient directed exam) and frontal sinus tenderness (Patient directed exam).   Mouth/Throat: Mucous membranes are erythematous.   Eyes: Lids are normal. Right eye exhibits no discharge. Left eye exhibits no discharge. Right conjunctiva is not injected. Left conjunctiva is not injected.   Pulmonary/Chest:  No respiratory distress.  Neurological: She is alert and oriented to person, place, and time. No cranial nerve deficit.   Psychiatric: She has a normal mood and affect. Her speech is normal and behavior is normal. Judgment and thought content normal.       Results for orders placed or performed in visit on 03/15/24   Hepatitis C Antibody    Collection Time: 03/15/24 11:05 AM    Specimen: Blood   Result Value Ref Range    Hep C Virus Ab Non Reactive Non Reactive   TSH Rfx On Abnormal To Free T4    Collection Time: 03/15/24 11:05 AM    Specimen: Blood   Result Value Ref Range    TSH 2.950 0.450 - 4.500 uIU/mL   Lipid Panel    Collection Time: 03/15/24 11:05 AM    Specimen: Blood   Result Value Ref Range    Total  Cholesterol 159 100 - 199 mg/dL    Triglycerides 108 0 - 149 mg/dL    HDL Cholesterol 46 >39 mg/dL    VLDL Cholesterol Arnol 20 5 - 40 mg/dL    LDL Chol Calc (NIH) 93 0 - 99 mg/dL   Comprehensive Metabolic Panel    Collection Time: 03/15/24 11:05 AM    Specimen: Blood   Result Value Ref Range    Glucose 93 70 - 99 mg/dL    BUN 11 6 - 24 mg/dL    Creatinine 0.65 0.57 - 1.00 mg/dL    EGFR Result 107 >59 mL/min/1.73    BUN/Creatinine Ratio 17 9 - 23    Sodium 144 134 - 144 mmol/L    Potassium 4.1 3.5 - 5.2 mmol/L    Chloride 107 (H) 96 - 106 mmol/L    Total CO2 21 20 - 29 mmol/L    Calcium 9.1 8.7 - 10.2 mg/dL    Total Protein 6.6 6.0 - 8.5 g/dL    Albumin 4.3 3.8 - 4.9 g/dL    Globulin 2.3 1.5 - 4.5 g/dL    A/G Ratio 1.9 1.2 - 2.2    Total Bilirubin 0.5 0.0 - 1.2 mg/dL    Alkaline Phosphatase 141 (H) 44 - 121 IU/L    AST (SGOT) 35 0 - 40 IU/L    ALT (SGPT) 39 (H) 0 - 32 IU/L   Hemoglobin A1c    Collection Time: 03/15/24 11:05 AM    Specimen: Blood   Result Value Ref Range    Hemoglobin A1C 5.7 (H) 4.8 - 5.6 %       Diagnoses and all orders for this visit:    1. Acute URI (Primary)    2. Lower respiratory infection    Other orders  -     azithromycin (Zithromax) 250 MG tablet; Take 2 tablets the first day, then 1 tablet daily for 4 days.  Dispense: 6 tablet; Refill: 0  -     guaiFENesin (Mucinex) 600 MG 12 hr tablet; Take 1 tablet by mouth 2 (Two) Times a Day for 14 days.  Dispense: 28 tablet; Refill: 0  -     promethazine-dextromethorphan (PROMETHAZINE-DM) 6.25-15 MG/5ML syrup; Take 5 mL by mouth At Night As Needed for Cough.  Dispense: 118 mL; Refill: 0    Risk and benefits of azithromycin discussed with patient  Azithromycin as directed  Probiotics for two weeks related to taking antibiotics. The pharmacist can help you with this if needed. Do not take within two hours of antibiotic.  Mucinex with plenty of fluids especially water to thin secretions to help clear chest congestion   Promethazine DM as needed for  nighttime cough  Do not drive after taking promethazine DM as it may make you drowsy  May take ibuprofen as needed for pain or fever    FOLLOW-UP  If symptoms worsen or persist follow up with PCP, Virtual Care or Urgent Care    Patient verbalizes understanding of medication dosage, comfort measures, instructions for treatment and follow-up.    Lori TYSON Yvan, APRN  01/18/2025  10:07 EST    The use of a video visit has been reviewed with the patient and verbal informed consent has been obtained. Myself and Chelsie Mccoy participated in this visit. The patient is located in [address] [place] [address].    I am located in [place], [place]. [employer] and [employer] were utilized. I spent 25 minutes in the patient's chart for this visit.

## 2025-01-18 NOTE — LETTER
January 18, 2025     Patient: Chelsie Mccoy   YOB: 1970   Date of Visit: 1/18/2025       To Whom It May Concern:    It is my medical opinion that Chelsie Mccoy may return to school on 01/20/2025.            Sincerely,        NALINI Olivares    CC: No Recipients

## 2025-01-18 NOTE — PATIENT INSTRUCTIONS
Upper Respiratory Infection, Adult  An upper respiratory infection (URI) is a common viral infection of the nose, throat, and upper air passages that lead to the lungs. The most common type of URI is the common cold. URIs usually get better on their own, without medical treatment.  What are the causes?  A URI is caused by a virus. You may catch a virus by:  Breathing in droplets from an infected person's cough or sneeze.  Touching something that has been exposed to the virus (is contaminated) and then touching your mouth, nose, or eyes.  What increases the risk?  You are more likely to get a URI if:  You are very young or very old.  You have close contact with others, such as at work, school, or a health care facility.  You smoke.  You have long-term (chronic) heart or lung disease.  You have a weakened disease-fighting system (immune system).  You have nasal allergies or asthma.  You are experiencing a lot of stress.  You have poor nutrition.  What are the signs or symptoms?  A URI usually involves some of the following symptoms:  Runny or stuffy (congested) nose.  Cough.  Sneezing.  Sore throat.  Headache.  Fatigue.  Fever.  Loss of appetite.  Pain in your forehead, behind your eyes, and over your cheekbones (sinus pain).  Muscle aches.  Redness or irritation of the eyes.  Pressure in the ears or face.  How is this diagnosed?  This condition may be diagnosed based on your medical history and symptoms, and a physical exam. Your health care provider may use a swab to take a mucus sample from your nose (nasal swab). This sample can be tested to determine what virus is causing the illness.  How is this treated?  URIs usually get better on their own within 7-10 days. Medicines cannot cure URIs, but your health care provider may recommend certain medicines to help relieve symptoms, such as:  Over-the-counter cold medicines.  Cough suppressants. Coughing is a type of defense against infection that helps to clear the  respiratory system, so take these medicines only as recommended by your health care provider.  Fever-reducing medicines.  Follow these instructions at home:  Activity  Rest as needed.  If you have a fever, stay home from work or school until your fever is gone or until your health care provider says your URI cannot spread to other people (is no longer contagious). Your health care provider may have you wear a face mask to prevent your infection from spreading.  Relieving symptoms  Gargle with a mixture of salt and water 3-4 times a day or as needed. To make salt water, completely dissolve ½-1 tsp (3-6 g) of salt in 1 cup (237 mL) of warm water.  Use a cool-mist humidifier to add moisture to the air. This can help you breathe more easily.  Eating and drinking    Drink enough fluid to keep your urine pale yellow.  Eat soups and other clear broths.  General instructions    Take over-the-counter and prescription medicines only as told by your health care provider. These include cold medicines, fever reducers, and cough suppressants.  Do not use any products that contain nicotine or tobacco. These products include cigarettes, chewing tobacco, and vaping devices, such as e-cigarettes. If you need help quitting, ask your health care provider.  Stay away from secondhand smoke.  Stay up to date on all immunizations, including the yearly (annual) flu vaccine.  Keep all follow-up visits. This is important.  How to prevent the spread of infection to others  URIs can be contagious. To prevent the infection from spreading:  Wash your hands with soap and water for at least 20 seconds. If soap and water are not available, use hand .  Avoid touching your mouth, face, eyes, or nose.  Cough or sneeze into a tissue or your sleeve or elbow instead of into your hand or into the air.    Contact a health care provider if:  You are getting worse instead of better.  You have a fever or chills.  Your mucus is brown or red.  You have  yellow or brown discharge coming from your nose.  You have pain in your face, especially when you bend forward.  You have swollen neck glands.  You have pain while swallowing.  You have white areas in the back of your throat.  Get help right away if:  You have shortness of breath that gets worse.  You have severe or persistent:  Headache.  Ear pain.  Sinus pain.  Chest pain.  You have chronic lung disease along with any of the following:  Making high-pitched whistling sounds when you breathe, most often when you breathe out (wheezing).  Prolonged cough (more than 14 days).  Coughing up blood.  A change in your usual mucus.  You have a stiff neck.  You have changes in your:  Vision.  Hearing.  Thinking.  Mood.  These symptoms may be an emergency. Get help right away. Call 911.  Do not wait to see if the symptoms will go away.  Do not drive yourself to the hospital.  Summary  An upper respiratory infection (URI) is a common infection of the nose, throat, and upper air passages that lead to the lungs.  A URI is caused by a virus.  URIs usually get better on their own within 7-10 days.  Medicines cannot cure URIs, but your health care provider may recommend certain medicines to help relieve symptoms.  This information is not intended to replace advice given to you by your health care provider. Make sure you discuss any questions you have with your health care provider.  Document Revised: 07/20/2022 Document Reviewed: 07/20/2022  [employer] Patient Education © 2024 [employer]

## 2025-02-15 ENCOUNTER — E-VISIT (OUTPATIENT)
Dept: ADMINISTRATIVE | Facility: OTHER | Age: 55
End: 2025-02-15
Payer: COMMERCIAL

## 2025-02-16 NOTE — E-VISIT ESCALATED
Status: Referred Out  Date: 02/15/2025 20:53:51  Acuity Level: Within 3 days  Referral message:  We're sorry you are not feeling well. Your safety is important to us. Sudden back pain in those over the age of 50 could indicate a problem with your spine. Lab tests and imaging may be required to rule out a more serious condition and   find the most effective treatment for you. For this reason, we would like for you to be seen in person.   For the most appropriate care, please be seen:   At a clinic or an urgent care  Within 3 days   You won't be charged for this visit.&nbsp;We hope   you feel better soon!   Patient: Chelsie Mccoy  Patient : 1970  Patient Address: [address] [place] [address]  Patient Phone: [phone]  Clinician Response: Unavailable  Diagnosis: Unavailable  Diagnosis ICD: Unavailable     Patient Interview Questions and Responses:  Clinical Protocol: Low back pain  Please select the reason for your visit today.: Low back pain  When did your back pain begin?: 1-2 weeks ago  Did your back pain begin suddenly?: Yes  Where is your back pain located? Select all that apply. Buttocks: No  Where is your back pain located? Select all that apply. Low back area: Yes  Where is your back pain located? Select all that apply. Mid back area: No  Where is your back pain located? Select all that apply. Upper back area: No  Where is your back pain located? Select all that apply. Neck: No  Some conditions are limited to one side of the body. This information will help identify a possible cause of your pain.Which side is the pain on?: Left side  Please describe your back pain. Constant - it is the same all the time: No  Please describe your back pain. It varies depending on activity: Yes  Please describe your back pain. It goes away when resting: Yes  Please describe your back pain. Can feel back muscles spasm at times: Yes  Does pain decrease when bending forward?: Yes  Please rate the  severity of your back pain on a pain scale, with 0 being no pain and 10 being the worst pain imaginable.: 7-9 = Severe Pain (difficult to perform normal daily activities)

## 2025-02-18 ENCOUNTER — E-VISIT (OUTPATIENT)
Dept: FAMILY MEDICINE CLINIC | Facility: TELEHEALTH | Age: 55
End: 2025-02-18

## 2025-02-18 NOTE — E-VISIT TREATED
Date: 2025 07:08:04  Clinician: Marita Wright  Clinician NPI: [id number]  Patient: Chelsie Mccoy  Patient : 1970  Patient Address: [address] [place] [address]  Patient Phone: [phone]  Visit Protocol: Hypertension medication refill  Patient Summary:  Chelsie is a 54 year old ( : 1970 ) female who initiated a visit for a hypertension medication refill.     Chelsie was diagnosed with hypertension 1 year or longer ago. Chelsie has previously been provided a prescription for   hypertension. Details about the requested medication refill as reported by the patient (free text): HYDROCHLOROTHIAZIDE 25MG TABLETS 1 TABLET 1 TIME PER Day   Month and year Chelsie started taking the medication: 2023   Blood pressure readings:    Chelsie reported a blood pressure reading from the last 30 days (see below):      Systolic blood pressure: 122     Diastolic blood pressure: 82      Chelsie was last seen in-person by a provider to have blood pressure checked less than 1 year ago. Chelsie was unable to report the blood pressure reading from the last in-person evaluation.   Heart rate: 78 beats per minute   Medication adherence    Chelsie last took the medication today.   Chelsie never forgets a dose of medication. Chelsie denies ever cutting back or stopping the medication.   Pertinent medical history   Chelsie has tried the following lifestyle changes to manage hypertension:     Exercise    Lost weight    Reduced salt intake    Sleeping more     Since the last in-person evaluation, Chelsie has not had a change in health.   The last time Chelsie had routine blood work completed was less than 1 year ago.   Chelsie denies having a history of angina, diabetes, heart problems, kidney disease, and   stroke. Chelsie has not had a hospitalization or surgery in the last 14 days.   Chelsie denies having new or worsening chest pain or shortness of breath, a severe headache, confusion, blurred vision, dizziness,  and any other symptoms of a medical   emergency.   Chelsie does not smoke or use smokeless tobacco. Chelsie does not vape or use other e-cigarette products.   Chelsie denies pregnancy and denies breastfeeding.     MEDICATIONS: fluticasone propionate nasal, guaifenesin oral, benzonatate oral, promethazine-DM oral, lisinopril oral, ondansetron oral, paroxetine oral, hydrochlorothiazide oral, hydroxyzine HCl oral, ALLERGIES: NKDA  Clinician Response:  Dear Chelsie,  We are able to refill your high blood pressure medication at this time, however, this service is not meant to replace ongoing management of your high blood pressure with your provider.  The continuity of your care is   important to us, and we recommend you continue to have your high blood pressure managed by your provider. High blood pressure can change over time and needs regular follow-up to ensure you are on the most appropriate medication for your condition.    Please read the full treatment plan and see my recommendations below.  Medication information  I am prescribing:     Hydrochlorothiazide 25 mg oral tablet. Take 1 tablet by mouth 1 time per day. Your prescription includes 1 refill.   To help keep your high blood pressure under control, you should take your medication exactly as prescribed. Do not stop taking medication   without talking to your provider. If you have difficulty taking your medication as prescribed, please call your healthcare provider to find a solution.  Self care  Steps you can take to control your blood pressure:     It is very important to take your medication as prescribed for your high blood pressure.    One of the ways to manage your high blood pressure is by controlling various risk factors related to your lifestyle. The following lifestyle habits can help to reduce damaging effects on your health related to your specific condition(s):       Maintain a healthy weight.    Eat a diet that includes fruits, vegetables, whole  grains, low-fat dairy products, poultry, fish, and legumes. The Dash Eating Plan is helpful for management of high blood pressure.    Limit the amount of red meat, saturated and trans fats, and foods high in sugar.    Reduce the amount of salt in your diet.    Limit your alcohol intake to no more than 2 drinks per day for men and 1 drink for women.    Enjoy regular physical activity (at least 30 minutes of physical activity 5 days a week).       Measure your blood pressure on a regular basis. High blood pressure often has no symptoms so it's important to monitor it to ensure it is in control.     For more information on managing high blood pressure, please visit:     Centers for Disease Control and Prevention (CDC): Managing High Blood Pressure    American Heart Association: Changes You Can Make to Manage High Blood Pressure     When to seek care  Hypertension is a complex and serious medical condition. Call 911 or go to the nearest emergency department immediately if you have a blood pressure reading of 180/120 or greater and have any of the following symptoms:     Chest pain    Shortness of breath    Symptoms of a stroke (such as facial drooping, numbness or tingling, speech difficulties, or changes in vision)    Back pain    Brown or bloody urine    Confusion    Dizziness    Headache    Nausea or vomiting     Seek medical care immediately for any new or worsening symptoms. If you have a blood pressure reading of 180/120 or greater and don't have any symptoms, wait a few minutes and check your blood pressure again. If it is still high, go to the nearest   emergency department or call 911.   Diagnosis: Hypertension  Diagnosis ICD: I10    Follow up instructions: ATTENTION: If you have been prescribed medications, your prescriptions will not be sent until you choose your pharmacy.  To do so open the link within your notification, or go to [employer] and click eVisit in the menu to open your   treatment plan. From there,  you can select your pharmacy at the bottom of your after visit summary. You can also go to [email or web address]  Prescriptions  Prescription: hydrochlorothiazide 25 mg oral tablet, take 1 tablet by mouth 1 time per day  Sent To: [facility] #[id number] - [id number] - [address] [place] [address]

## 2025-02-19 DIAGNOSIS — I10 HYPERTENSION, ESSENTIAL: ICD-10-CM

## 2025-02-19 DIAGNOSIS — E78.2 MIXED HYPERLIPIDEMIA: ICD-10-CM

## 2025-02-19 DIAGNOSIS — F41.1 GENERALIZED ANXIETY DISORDER: ICD-10-CM

## 2025-02-19 DIAGNOSIS — R23.2 HOT FLASHES: ICD-10-CM

## 2025-02-19 RX ORDER — PAROXETINE 10 MG/1
10 TABLET, FILM COATED ORAL NIGHTLY
Qty: 90 TABLET | Refills: 1 | Status: SHIPPED | OUTPATIENT
Start: 2025-02-19

## 2025-02-19 RX ORDER — ATORVASTATIN CALCIUM 20 MG/1
20 TABLET, FILM COATED ORAL DAILY
Qty: 90 TABLET | Refills: 1 | Status: SHIPPED | OUTPATIENT
Start: 2025-02-19

## 2025-02-19 RX ORDER — LISINOPRIL 10 MG/1
10 TABLET ORAL DAILY
Qty: 90 TABLET | Refills: 1 | Status: SHIPPED | OUTPATIENT
Start: 2025-02-19

## 2025-03-27 DIAGNOSIS — I10 PRIMARY HYPERTENSION: ICD-10-CM

## 2025-03-27 RX ORDER — HYDROCHLOROTHIAZIDE 25 MG/1
25 TABLET ORAL DAILY
Qty: 90 TABLET | Refills: 1 | Status: SHIPPED | OUTPATIENT
Start: 2025-03-27

## 2025-03-29 ENCOUNTER — TELEMEDICINE (OUTPATIENT)
Dept: FAMILY MEDICINE CLINIC | Facility: TELEHEALTH | Age: 55
End: 2025-03-29

## 2025-03-29 DIAGNOSIS — H66.92 LEFT OTITIS MEDIA, UNSPECIFIED OTITIS MEDIA TYPE: Primary | ICD-10-CM

## 2025-03-29 DIAGNOSIS — J01.01 ACUTE RECURRENT MAXILLARY SINUSITIS: ICD-10-CM

## 2025-03-29 RX ORDER — FLUTICASONE PROPIONATE 50 MCG
2 SPRAY, SUSPENSION (ML) NASAL DAILY
Qty: 16 G | Refills: 0 | Status: SHIPPED | OUTPATIENT
Start: 2025-03-29 | End: 2025-04-28

## 2025-03-29 RX ORDER — CETIRIZINE HYDROCHLORIDE, PSEUDOEPHEDRINE HYDROCHLORIDE 5; 120 MG/1; MG/1
1 TABLET, FILM COATED, EXTENDED RELEASE ORAL 2 TIMES DAILY
Qty: 60 TABLET | Refills: 0 | Status: SHIPPED | OUTPATIENT
Start: 2025-03-29 | End: 2025-04-28

## 2025-03-29 RX ORDER — CEFDINIR 300 MG/1
300 CAPSULE ORAL 2 TIMES DAILY
Qty: 14 CAPSULE | Refills: 0 | Status: SHIPPED | OUTPATIENT
Start: 2025-03-29 | End: 2025-04-05

## 2025-03-29 NOTE — PROGRESS NOTES
No chief complaint on file.      Video Visit Reason:   Free Text Description: Ear ache and swollen gland left ear, cough chest congestion  Subjective   Chelsie Mccoy is a 54 y.o. female.     History of Present Illness  The patient presents via virtual visit for evaluation of an earache and a swollen gland on the left side.    She reports a palpable nodule around her left ear, accompanied by a swishing sound suggestive of fluid accumulation. These symptoms have been present for approximately one week and have progressively worsened. She also experiences cough, congestion, and postnasal drainage, which have intensified since the onset of her ear symptoms. Her hearing in the affected ear is compromised, and she has been experiencing intermittent fevers. Currently, she feels flushed but does not believe she has a fever. She also reports tenderness in her left maxillary sinus and pain upon palpation of the bone behind her ear. The sensation of fluid in her ear is described as akin to a whirlpool. She has been experiencing nausea and occasional dizziness over the past few days, particularly when standing up too quickly or sneezing. She has no known drug allergies. She has a history of seasonal allergies, but this year has been particularly severe. She has had recurrent ear infections this season, necessitating multiple calls to her healthcare provider, despite no prior history of ear issues.    ALLERGIES  The patient has no known allergies.      The following portions of the patient's history were reviewed and updated as appropriate: allergies, current medications, past medical history, and problem list.      Past Medical History:   Diagnosis Date    Hypertension      Social History     Socioeconomic History    Marital status:    Tobacco Use    Smoking status: Never     Passive exposure: Never    Smokeless tobacco: Never   Vaping Use    Vaping status: Never Used   Substance and Sexual Activity    Alcohol use: Not  Currently    Drug use: Never    Sexual activity: Not Currently     Birth control/protection: Post-menopausal     medication documentation: reviewed and updated with patient and   Current Outpatient Medications:     atorvastatin (LIPITOR) 20 MG tablet, Take 1 tablet by mouth Daily., Disp: 90 tablet, Rfl: 1    cefdinir (OMNICEF) 300 MG capsule, Take 1 capsule by mouth 2 (Two) Times a Day for 7 days., Disp: 14 capsule, Rfl: 0    cetirizine-pseudoephedrine (ZyrTEC-D) 5-120 MG per 12 hr tablet, Take 1 tablet by mouth 2 (Two) Times a Day for 30 days., Disp: 60 tablet, Rfl: 0    fluticasone (FLONASE) 50 MCG/ACT nasal spray, Administer 2 sprays into the nostril(s) as directed by provider Daily for 30 days. Administer 2 sprays in each nostril for each dose., Disp: 16 g, Rfl: 0    hydroCHLOROthiazide 25 MG tablet, Take 1 tablet by mouth Daily., Disp: 90 tablet, Rfl: 1    hydrOXYzine (ATARAX) 25 MG tablet, Take 1 tablet by mouth 3 (Three) Times a Day As Needed for Anxiety. for anxiety, Disp: 90 tablet, Rfl: 3    lisinopril (PRINIVIL,ZESTRIL) 10 MG tablet, Take 1 tablet by mouth Daily., Disp: 90 tablet, Rfl: 1    ondansetron ODT (ZOFRAN-ODT) 4 MG disintegrating tablet, Place 1 tablet on the tongue Every 8 (Eight) Hours As Needed for Nausea or Vomiting., Disp: 10 tablet, Rfl: 0    PARoxetine (Paxil) 10 MG tablet, Take 1 tablet by mouth Every Night., Disp: 90 tablet, Rfl: 1  Review of Systems  See HPI  Objective   Physical Exam  Constitutional:       General: She is not in acute distress.  HENT:      Head:      Comments: Cheeks are flushed     Left Ear: Decreased hearing (subjective) noted. Tenderness (on patient guided palpation around the perimeter of the ear) present. There is mastoid tenderness (patient guided exam).      Nose:      Left Sinus: Maxillary sinus tenderness present.      Mouth/Throat:      Pharynx: Posterior oropharyngeal erythema present.   Neurological:      Mental Status: She is alert.         Assessment &  Plan   Diagnoses and all orders for this visit:    1. Left otitis media, unspecified otitis media type (Primary)  -     cefdinir (OMNICEF) 300 MG capsule; Take 1 capsule by mouth 2 (Two) Times a Day for 7 days.  Dispense: 14 capsule; Refill: 0    2. Acute recurrent maxillary sinusitis  -     cefdinir (OMNICEF) 300 MG capsule; Take 1 capsule by mouth 2 (Two) Times a Day for 7 days.  Dispense: 14 capsule; Refill: 0  -     fluticasone (FLONASE) 50 MCG/ACT nasal spray; Administer 2 sprays into the nostril(s) as directed by provider Daily for 30 days. Administer 2 sprays in each nostril for each dose.  Dispense: 16 g; Refill: 0  -     cetirizine-pseudoephedrine (ZyrTEC-D) 5-120 MG per 12 hr tablet; Take 1 tablet by mouth 2 (Two) Times a Day for 30 days.  Dispense: 60 tablet; Refill: 0     She is advised to continue using allergy medication after completing the antibiotic course to prevent recurrence. If the dizziness improves, she can switch to plain Zyrtec and continue using Flonase throughout the spring season.             Follow Up:  If your symptoms are not resolving by the completion of your treatment or are worsening, see your primary care provider for follow up. If you don't have a primary care provider, you may go to any Urgent Care for re-evaluation. If you develop any life threatening symptoms, go to the nearest Emergency Department immediately or call EMS.       Patient or patient representative verbalized consent for the use of Ambient Listening during the visit with  NALINI Espinoza for chart documentation. 3/29/2025  10:03 EDT        The use of  Video Visit was utilized during this visit, using both [employer] and [employer]. The use of a video visit has been reviewed with the patient and verbal informed consent has been obtained. No technical difficulties occurred during the visit.    is located at [address] [place] [id number]  Provider is located at [place], [place]

## 2025-03-29 NOTE — PATIENT INSTRUCTIONS
Otitis Media, Adult    Otitis media occurs when there is inflammation and fluid in the middle ear with signs and symptoms of an acute infection. The middle ear is a part of the ear that contains bones for hearing as well as air that helps send sounds to the brain. When infected fluid builds up in this space, it causes pressure and can lead to an ear infection. The eustachian tube connects the middle ear to the back of the nose (nasopharynx) and normally allows air into the middle ear. If the eustachian tube becomes blocked, fluid can build up and become infected.  What are the causes?  This condition is caused by a blockage in the eustachian tube. This can be caused by mucus or by swelling of the tube. Problems that can cause a blockage include:  A cold or other upper respiratory infection.  Allergies.  An irritant, such as tobacco smoke.  Enlarged adenoids. The adenoids are areas of soft tissue located high in the back of the throat, behind the nose and the roof of the mouth. They are part of the body's defense system (immune system).  A mass in the nasopharynx.  Damage to the ear caused by pressure changes (barotrauma).  What increases the risk?  You are more likely to develop this condition if you:  Smoke or are exposed to tobacco smoke.  Have an opening in the roof of your mouth (cleft palate).  Have gastroesophageal reflux.  Have an immune system disorder.  What are the signs or symptoms?  Symptoms of this condition include:  Ear pain.  Fever.  Decreased hearing.  Tiredness (lethargy).  Fluid leaking from the ear, if the eardrum is ruptured or has burst.  Ringing in the ear.  How is this diagnosed?    This condition is diagnosed with a physical exam. During the exam, your health care provider will use an instrument called an otoscope to look in your ear and check for redness, swelling, and fluid. He or she will also ask about your symptoms.  Your health care provider may also order tests, such as:  A pneumatic  otoscopy. This is a test to check the movement of the eardrum. It is done by squeezing a small amount of air into the ear.  A tympanogram. This is a test that shows how well the eardrum moves in response to air pressure in the ear canal. It provides a graph for your health care provider to review.  How is this treated?  This condition can go away on its own within 3-5 days. But if the condition is caused by a bacterial infection and does not go away on its own, or if it keeps coming back, your health care provider may:  Prescribe antibiotic medicine to treat the infection.  Prescribe or recommend medicines to control pain.  Follow these instructions at home:  Take over-the-counter and prescription medicines only as told by your health care provider.  If you were prescribed an antibiotic medicine, take it as told by your health care provider. Do not stop taking the antibiotic even if you start to feel better.  Keep all follow-up visits. This is important.  Contact a health care provider if:  You have bleeding from your nose.  There is a lump on your neck.  You are not feeling better in 5 days.  You feel worse instead of better.  Get help right away if:  You have severe pain that is not controlled with medicine.  You have swelling, redness, or pain around your ear.  You have stiffness in your neck.  A part of your face is not moving (paralyzed).  The bone behind your ear (mastoid bone) is tender when you touch it.  You develop a severe headache.  Summary  Otitis media is redness, soreness, and swelling of the middle ear, usually resulting in pain and decreased hearing.  This condition can go away on its own within 3-5 days.  If the problem does not go away in 3-5 days, your health care provider may give you medicines to treat the infection.  If you were prescribed an antibiotic medicine, take it as told by your health care provider.  Follow all instructions that were given to you by your health care provider.  This  information is not intended to replace advice given to you by your health care provider. Make sure you discuss any questions you have with your health care provider.  Document Revised: 03/28/2022 Document Reviewed: 03/28/2022  Elsevier Patient Education © 2024 [employer]Sinus Infection, Adult  A sinus infection is soreness and swelling (inflammation) of your sinuses. Sinuses are hollow spaces in the bones around your face. They are located:  Around your eyes.  In the middle of your forehead.  Behind your nose.  In your cheekbones.  Your sinuses and nasal passages are lined with a fluid called mucus. Mucus drains out of your sinuses. Swelling can trap mucus in your sinuses. This lets germs (bacteria, virus, or fungus) grow, which leads to infection. Most of the time, this condition is caused by a virus.  What are the causes?  Allergies.  Asthma.  Germs.  Things that block your nose or sinuses.  Growths in the nose (nasal polyps).  Chemicals or irritants in the air.  A fungus. This is rare.  What increases the risk?  Having a weak body defense system (immune system).  Doing a lot of swimming or diving.  Using nasal sprays too much.  Smoking.  What are the signs or symptoms?  The main symptoms of this condition are pain and a feeling of pressure around the sinuses. Other symptoms include:  Stuffy nose (congestion). This may make it hard to breathe through your nose.  Runny nose (drainage).  Soreness, swelling, and warmth in the sinuses.  A cough that may get worse at night.  Being unable to smell and taste.  Mucus that collects in the throat or the back of the nose (postnasal drip). This may cause a sore throat or bad breath.  Being very tired (fatigued).  A fever.  How is this diagnosed?  Your symptoms.  Your medical history.  A physical exam.  Tests to find out if your condition is short-term (acute) or long-term (chronic). Your doctor may:  Check your nose for growths (polyps).  Check your sinuses using a tool  that has a light on one end (endoscope).  Check for allergies or germs.  Do imaging tests, such as an MRI or CT scan.  How is this treated?  Treatment for this condition depends on the cause and whether it is short-term or long-term.  If caused by a virus, your symptoms should go away on their own within 10 days. You may be given medicines to relieve symptoms. They include:  Medicines that shrink swollen tissue in the nose.  A spray that treats swelling of the nostrils.  Rinses that help get rid of thick mucus in your nose (nasal saline washes).  Medicines that treat allergies (antihistamines).  Over-the-counter pain relievers.  If caused by bacteria, your doctor may wait to see if you will get better without treatment. You may be given antibiotic medicine if you have:  A very bad infection.  A weak body defense system.  If caused by growths in the nose, surgery may be needed.  Follow these instructions at home:  Medicines  Take, use, or apply over-the-counter and prescription medicines only as told by your doctor. These may include nasal sprays.  If you were prescribed an antibiotic medicine, take it as told by your doctor. Do not stop taking it even if you start to feel better.  Hydrate and humidify    Drink enough water to keep your pee (urine) pale yellow.  Use a cool mist humidifier to keep the humidity level in your home above 50%.  Breathe in steam for 10-15 minutes, 3-4 times a day, or as told by your doctor. You can do this in the bathroom while a hot shower is running.  Try not to spend time in cool or dry air.  Rest  Rest as much as you can.  Sleep with your head raised (elevated).  Make sure you get enough sleep each night.  General instructions    Put a warm, moist washcloth on your face 3-4 times a day, or as often as told by your doctor.  Use nasal saline washes as often as told by your doctor.  Wash your hands often with soap and water. If you cannot use soap and water, use hand .  Do not  smoke. Avoid being around people who are smoking (secondhand smoke).  Keep all follow-up visits.  Contact a doctor if:  You have a fever.  Your symptoms get worse.  Your symptoms do not get better within 10 days.  Get help right away if:  You have a very bad headache.  You cannot stop vomiting.  You have very bad pain or swelling around your face or eyes.  You have trouble seeing.  You feel confused.  Your neck is stiff.  You have trouble breathing.  These symptoms may be an emergency. Get help right away. Call 911.  Do not wait to see if the symptoms will go away.  Do not drive yourself to the hospital.  Summary  A sinus infection is swelling of your sinuses. Sinuses are hollow spaces in the bones around your face.  This condition is caused by tissues in your nose that become inflamed or swollen. This traps germs. These can lead to infection.  If you were prescribed an antibiotic medicine, take it as told by your doctor. Do not stop taking it even if you start to feel better.  Keep all follow-up visits.  This information is not intended to replace advice given to you by your health care provider. Make sure you discuss any questions you have with your health care provider.  Document Revised: 11/22/2022 Document Reviewed: 11/22/2022  Elsevier Patient Education © 2024 [employer] Inc[employer]

## 2025-07-27 ENCOUNTER — E-VISIT (OUTPATIENT)
Dept: ADMINISTRATIVE | Facility: OTHER | Age: 55
End: 2025-07-27
Payer: COMMERCIAL

## 2025-07-27 NOTE — E-VISIT ESCALATED
Status: Referred Out  Date: 2025 16:21:58  Acuity Level: Within 24 hours  Referral message:  We're sorry you are not feeling well. Your safety is important to us. The color of your discharge could be a sign of a more complicated condition. We would like for you to be seen in person to determine the cause of your symptoms and   the most effective treatment for you.  For the most appropriate care, please be seen:   At a clinic or urgent care  Within 24 hours   You won't be charged for this visit. We hope you feel better soon!   Patient: Chelsie Mccoy  Patient : 1970  Patient Address: [address] [place] [address]  Patient Phone: [phone]  Clinician Response: Unavailable  Diagnosis: Unavailable  Diagnosis ICD: Unavailable     Patient Interview Questions and Responses:  Clinical Protocol: UTI  Please select the reason for your visit today.: Urinary tract infection (UTI)  When did your symptoms start?: 1-3 days ago  Do you have any of the following symptoms? Pain, burning, or discomfort with urination: Yes  Do you have any of the following symptoms? Urinating more often than usual: Yes  Do you have any of the following symptoms? A sudden urge to urinate: Yes  Do you have any of the following symptoms? Unable to hold urine: No  Do you have any of the following symptoms? Feeling like the bladder is never empty: Yes  Do you have any of the following symptoms? Foul-smelling urine: Yes  What color is your urine?: Brown  Do you have any of the following vaginal symptoms? Discharge: Yes  Do you have any of the following vaginal symptoms? Itching: Yes  How would you describe the vaginal discharge? Select all that apply. Foamy: No  How would you describe the vaginal discharge? Select all that apply. [place]: No  How would you describe the vaginal discharge? Select all that apply. Smooth: No  How would you describe the vaginal discharge? Select all that apply. Watery: Yes  What color is your  discharge?: Green or yellow